# Patient Record
(demographics unavailable — no encounter records)

---

## 2025-02-13 NOTE — PHYSICAL EXAM
Detail Level: Detailed Treatment Number (Will Not Render If 0): 0 Include Z78.9 (Other Specified Conditions Influencing Health Status) As An Associated Diagnosis?: No Post-Care Instructions: I reviewed with the patient in detail post-care instructions. Patient is to wear sunprotection, and avoid picking at any of the treated lesions. Pt may apply Vaseline to crusted or scabbing areas. Consent: The patient's consent was obtained including but not limited to risks of crusting, scabbing, blistering, scarring, darker or lighter pigmentary change, recurrence, incomplete removal and infection. Medical Necessity Clause: This procedure was medically necessary because the lesions that were treated were itchy and irritated. Medical Necessity Information: It is in your best interest to select a reason for this procedure from the list below. All of these items fulfill various CMS LCD requirements except the new and changing color options. [Normal] : no acute distress, well nourished, well developed and well-appearing [No Respiratory Distress] : no respiratory distress  [No Accessory Muscle Use] : no accessory muscle use [No Focal Deficits] : no focal deficits [Normal Affect] : the affect was normal [Normal Insight/Judgement] : insight and judgment were intact [45227 - High Complexity requires an extensive number of possible diagnoses and/or the management options, extensive complexity of the medical data (tests, etc.) to be reviewed, and a high risk of significant complications, morbidity, and/or mortality as w] : High Complexity

## 2025-02-13 NOTE — ASSESSMENT
[FreeTextEntry1] : 65y/o F, with PMHX of asthma, ocular myasthenia gravis, HTN and HLD presented with generalized weakness, cough, intermittent nausea, vomiting, diarrhea, and falls. Admitted for sepsis, acute hypoxemic respiratory failure, secondary to bronchiolitis/asthma exacerbation in the setting of Influenza A and possible pneumonia. On admission with leukocytosis, tachypnea, and a suspected infectious source. Treated with supplemental o2, IVF, empiric doxycycline, Tamiflu, IV solumedrol followed by a 5-day course of prednisone, Duonebs and home Advair. Blood cultures negative. Upon discharge, continue prednisone 40mg for 2 more days then switch to home dose prednisone. Continue doxycycline 100mg twice daily for 2 more days. Continue Tamiflu twice daily for 2 more days.   Upon presentation, is doing well overall, reports she feels better than she dis prior to hospitalization. Denies cough, SOB, wheezing, fever, chills, chest tightness, hoarseness, change in voice, or rhinorrhea.  Seen a followed by Pulmonologist, Cardiologist, and PCP at [facility]. With remote smoking history, casual smoker, quit 45 years ago, no pets, denies exposures.  -All medications reviewed and reconciled with patient, use as prescribed. -F/U with Pulmonologist, Cardiology, PCP in 1-2 weeks -Advised to call office immediately with any change or worsening of symptoms -Advised to call 911 of go to ER immediately with any S/S of acute respiratory distress or worsening of symptoms -Reviewed F/U CT scan in 6-8 weeks, resolution of PNA

## 2025-02-13 NOTE — HISTORY OF PRESENT ILLNESS
[Home] : at home, [unfilled] , at the time of the visit. [Medical Office: ([facility])___] : at the medical office located in  [Telehealth (audio & video)] : This visit was provided via telehealth using real-time 2-way audio visual technology. [Verbal consent obtained from patient] : the patient, [unfilled] [[place]] : Post-hospitalization from [facility] [Asthma] : Asthma [Pneumonia] : Pneumonia [Respiratory failure with hypoxia] : Respiratory failure with hypoxia [Other: _____] : [unfilled] [Yes] : Yes [Admitted on: ___] : The patient was admitted on [unfilled] [Discharged on ___] : discharged on [unfilled] [Discharge Summary] : discharge summary [Pertinent Labs] : pertinent labs [Radiology Findings] : radiology findings [Discharge Med List] : discharge medication list [Med Reconciliation] : medication reconciliation has been completed [Patient Contacted By: ____] : and contacted by [unfilled] [FreeTextEntry2] : Patient is a 63y/o F, with PMHX of asthma, ocular myasthenia gravis, HTN and HLD presented with generalized weakness, cough, intermittent nausea, vomiting, diarrhea, and falls. Admitted for sepsis, acute hypoxemic respiratory failure, secondary to bronchiolitis/asthma exacerbation in the setting of Influenza A and possible pneumonia. On admission with leukocytosis, tachypnea, and a suspected infectious source. Treated with supplemental o2, IVF, empiric doxycycline, Tamiflu, IV solumedrol followed by a 5-day course of prednisone, Duonebs and home Advair. Blood cultures negative. Upon discharge, continue prednisone 40mg for 2 more days then switch to home dose prednisone. Continue doxycycline 100mg twice daily for 2 more days. Continue Tamiflu twice daily for 2 more days.   Upon presentation, is doing well overall, reports she feels better than she dis prior to hospitalization. Denies cough, SOB, wheezing, fever, chills, chest tightness, hoarseness, change in voice, or rhinorrhea.  Seen a followed by Pulmonologist, Cardiologist, and PCP at [facility]. With remote smoking history, casual smoker, quit 45 years ago, no pets, denies exposures.

## 2025-02-13 NOTE — HISTORY OF PRESENT ILLNESS
[Home] : at home, [unfilled] , at the time of the visit. [Medical Office: ([facility])___] : at the medical office located in  [Telehealth (audio & video)] : This visit was provided via telehealth using real-time 2-way audio visual technology. [Verbal consent obtained from patient] : the patient, [unfilled] [[place]] : Post-hospitalization from [facility] [Asthma] : Asthma [Pneumonia] : Pneumonia [Respiratory failure with hypoxia] : Respiratory failure with hypoxia [Other: _____] : [unfilled] [Yes] : Yes [Admitted on: ___] : The patient was admitted on [unfilled] [Discharged on ___] : discharged on [unfilled] [Discharge Summary] : discharge summary [Pertinent Labs] : pertinent labs [Radiology Findings] : radiology findings [Discharge Med List] : discharge medication list [Med Reconciliation] : medication reconciliation has been completed [Patient Contacted By: ____] : and contacted by [unfilled] [FreeTextEntry2] : Patient is a 65y/o F, with PMHX of asthma, ocular myasthenia gravis, HTN and HLD presented with generalized weakness, cough, intermittent nausea, vomiting, diarrhea, and falls. Admitted for sepsis, acute hypoxemic respiratory failure, secondary to bronchiolitis/asthma exacerbation in the setting of Influenza A and possible pneumonia. On admission with leukocytosis, tachypnea, and a suspected infectious source. Treated with supplemental o2, IVF, empiric doxycycline, Tamiflu, IV solumedrol followed by a 5-day course of prednisone, Duonebs and home Advair. Blood cultures negative. Upon discharge, continue prednisone 40mg for 2 more days then switch to home dose prednisone. Continue doxycycline 100mg twice daily for 2 more days. Continue Tamiflu twice daily for 2 more days.   Upon presentation, is doing well overall, reports she feels better than she dis prior to hospitalization. Denies cough, SOB, wheezing, fever, chills, chest tightness, hoarseness, change in voice, or rhinorrhea.  Seen a followed by Pulmonologist, Cardiologist, and PCP at [facility]. With remote smoking history, casual smoker, quit 45 years ago, no pets, denies exposures.

## 2025-02-13 NOTE — ASSESSMENT
[FreeTextEntry1] : 65y/o F, with PMHX of asthma, ocular myasthenia gravis, HTN and HLD presented with generalized weakness, cough, intermittent nausea, vomiting, diarrhea, and falls. Admitted for sepsis, acute hypoxemic respiratory failure, secondary to bronchiolitis/asthma exacerbation in the setting of Influenza A and possible pneumonia. On admission with leukocytosis, tachypnea, and a suspected infectious source. Treated with supplemental o2, IVF, empiric doxycycline, Tamiflu, IV solumedrol followed by a 5-day course of prednisone, Duonebs and home Advair. Blood cultures negative. Upon discharge, continue prednisone 40mg for 2 more days then switch to home dose prednisone. Continue doxycycline 100mg twice daily for 2 more days. Continue Tamiflu twice daily for 2 more days.   Upon presentation, is doing well overall, reports she feels better than she dis prior to hospitalization. Denies cough, SOB, wheezing, fever, chills, chest tightness, hoarseness, change in voice, or rhinorrhea.  Seen a followed by Pulmonologist, Cardiologist, and PCP at [facility]. With remote smoking history, casual smoker, quit 45 years ago, no pets, denies exposures.  -All medications reviewed and reconciled with patient, use as prescribed. -F/U with Pulmonologist, Cardiology, PCP in 1-2 weeks -Advised to call office immediately with any change or worsening of symptoms -Advised to call 911 of go to ER immediately with any S/S of acute respiratory distress or worsening of symptoms -Reviewed F/U CT scan in 6-8 weeks, resolution of PNA

## 2025-02-13 NOTE — PHYSICAL EXAM
[Normal] : no acute distress, well nourished, well developed and well-appearing [No Respiratory Distress] : no respiratory distress  [No Accessory Muscle Use] : no accessory muscle use [No Focal Deficits] : no focal deficits [Normal Affect] : the affect was normal [Normal Insight/Judgement] : insight and judgment were intact [03838 - High Complexity requires an extensive number of possible diagnoses and/or the management options, extensive complexity of the medical data (tests, etc.) to be reviewed, and a high risk of significant complications, morbidity, and/or mortality as w] : High Complexity